# Patient Record
Sex: FEMALE | Race: WHITE
[De-identification: names, ages, dates, MRNs, and addresses within clinical notes are randomized per-mention and may not be internally consistent; named-entity substitution may affect disease eponyms.]

---

## 2020-08-02 ENCOUNTER — HOSPITAL ENCOUNTER (INPATIENT)
Dept: HOSPITAL 96 - M.ERS | Age: 40
LOS: 2 days | Discharge: HOME | DRG: 917 | End: 2020-08-04
Attending: INTERNAL MEDICINE | Admitting: INTERNAL MEDICINE
Payer: COMMERCIAL

## 2020-08-02 VITALS
DIASTOLIC BLOOD PRESSURE: 72 MMHG | BODY MASS INDEX: 34.32 KG/M2 | WEIGHT: 206 LBS | HEIGHT: 65 IN | SYSTOLIC BLOOD PRESSURE: 123 MMHG

## 2020-08-02 DIAGNOSIS — D72.829: ICD-10-CM

## 2020-08-02 DIAGNOSIS — F10.229: ICD-10-CM

## 2020-08-02 DIAGNOSIS — Z03.818: ICD-10-CM

## 2020-08-02 DIAGNOSIS — F17.210: ICD-10-CM

## 2020-08-02 DIAGNOSIS — T50.992A: Primary | ICD-10-CM
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
## 2020-08-02 DIAGNOSIS — R45.851: ICD-10-CM

## 2020-08-02 DIAGNOSIS — F32.9: ICD-10-CM

## 2020-08-02 DIAGNOSIS — G92: ICD-10-CM

## 2020-08-02 DIAGNOSIS — Z79.899: ICD-10-CM

## 2020-08-02 DIAGNOSIS — F41.9: ICD-10-CM

## 2020-08-02 DIAGNOSIS — E87.6: ICD-10-CM
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
## 2020-08-02 LAB
ALBUMIN SERPL-MCNC: 4 G/DL (ref 3.4–5)
ALP SERPL-CCNC: 81 U/L (ref 46–116)
ALT SERPL-CCNC: 22 U/L (ref 30–65)
ANION GAP SERPL CALC-SCNC: 12 MMOL/L (ref 7–16)
APAP SERPL-MCNC: < 2 UG/ML (ref 10–30)
AST SERPL-CCNC: 13 U/L (ref 15–37)
BILIRUB SERPL-MCNC: 0.5 MG/DL
BILIRUB UR-MCNC: NEGATIVE MG/DL
BUN SERPL-MCNC: 12 MG/DL (ref 7–18)
CALCIUM SERPL-MCNC: 8.5 MG/DL (ref 8.5–10.1)
CHLORIDE SERPL-SCNC: 103 MMOL/L (ref 98–107)
CO2 SERPL-SCNC: 24 MMOL/L (ref 21–32)
COLOR UR: YELLOW
CREAT SERPL-MCNC: 1 MG/DL (ref 0.6–1.3)
ETHANOL SERPL-MCNC: 106 MG/DL (ref ?–10)
GLUCOSE SERPL-MCNC: 172 MG/DL (ref 70–99)
HCT VFR BLD CALC: 46 % (ref 37–47)
HGB BLD-MCNC: 15.9 GM/DL (ref 12–15)
KETONES UR STRIP-MCNC: NEGATIVE MG/DL
MAGNESIUM SERPL-MCNC: 2 MG/DL (ref 1.8–2.4)
MCH RBC QN AUTO: 32 PG (ref 26–34)
MCHC RBC AUTO-ENTMCNC: 34.5 G/DL (ref 28–37)
MCV RBC: 92.8 FL (ref 80–100)
MPV: 9 FL. (ref 7.2–11.1)
NUCLEATED RBCS: 0 /100WBC
PLATELET COUNT*: 307 THOU/UL (ref 150–400)
POTASSIUM SERPL-SCNC: 3.2 MMOL/L (ref 3.5–5.1)
PROT SERPL-MCNC: 7.6 G/DL (ref 6.4–8.2)
PROT UR QL STRIP: NEGATIVE
RBC # BLD AUTO: 4.96 MIL/UL (ref 4.2–5)
RBC # UR STRIP: NEGATIVE /UL
RDW-CV: 13 % (ref 10.5–14.5)
SALICYLATES SERPL-MCNC: 3.2 MG/DL (ref 2.8–20)
SODIUM SERPL-SCNC: 139 MMOL/L (ref 136–145)
SP GR UR STRIP: 1.02 (ref 1–1.03)
URINE CLARITY: CLEAR
URINE GLUCOSE-RANDOM: NEGATIVE
URINE LEUKOCYTES-REFLEX: NEGATIVE
URINE NITRITE-REFLEX: NEGATIVE
UROBILINOGEN UR STRIP-ACNC: 0.2 E.U./DL (ref 0.2–1)
WBC # BLD AUTO: 15.2 THOU/UL (ref 4–11)

## 2020-08-02 NOTE — NUR
SPOKE TO MINDY REHMANION RICARDO REGARDING OVERDOSE OF TRAZADONE, REQUIP, AND
ACYLCOVIR, MINDY RECOMMENDS HYDRATION, RULE OUT OTHER TOXINS, AND WATCH FOR
HEADACHE, NAUSEA, AND VOMITTING.

## 2020-08-03 VITALS — DIASTOLIC BLOOD PRESSURE: 65 MMHG | SYSTOLIC BLOOD PRESSURE: 106 MMHG

## 2020-08-03 VITALS — DIASTOLIC BLOOD PRESSURE: 67 MMHG | SYSTOLIC BLOOD PRESSURE: 109 MMHG

## 2020-08-03 VITALS — DIASTOLIC BLOOD PRESSURE: 53 MMHG | SYSTOLIC BLOOD PRESSURE: 106 MMHG

## 2020-08-03 VITALS — DIASTOLIC BLOOD PRESSURE: 75 MMHG | SYSTOLIC BLOOD PRESSURE: 120 MMHG

## 2020-08-03 VITALS — SYSTOLIC BLOOD PRESSURE: 136 MMHG | DIASTOLIC BLOOD PRESSURE: 88 MMHG

## 2020-08-03 VITALS — DIASTOLIC BLOOD PRESSURE: 69 MMHG | SYSTOLIC BLOOD PRESSURE: 111 MMHG

## 2020-08-03 VITALS — DIASTOLIC BLOOD PRESSURE: 65 MMHG | SYSTOLIC BLOOD PRESSURE: 109 MMHG

## 2020-08-03 VITALS — SYSTOLIC BLOOD PRESSURE: 102 MMHG | DIASTOLIC BLOOD PRESSURE: 63 MMHG

## 2020-08-03 VITALS — DIASTOLIC BLOOD PRESSURE: 68 MMHG | SYSTOLIC BLOOD PRESSURE: 113 MMHG

## 2020-08-03 VITALS — SYSTOLIC BLOOD PRESSURE: 116 MMHG | DIASTOLIC BLOOD PRESSURE: 71 MMHG

## 2020-08-03 VITALS — DIASTOLIC BLOOD PRESSURE: 55 MMHG | SYSTOLIC BLOOD PRESSURE: 91 MMHG

## 2020-08-03 LAB
ABSOLUTE MONOCYTES: 0.6 THOU/UL (ref 0–1.2)
BE: -2.5 MMOL/L
GRANULOCYTES NFR BLD MANUAL: 76 %
LYMPHOCYTES # BLD: 1.7 THOU/UL (ref 0.8–5.3)
LYMPHOCYTES NFR BLD AUTO: 11 %
MONOCYTES NFR BLD: 4 %
NEUTROPHILS # BLD: 12.9 THOU/UL (ref 1.6–8.1)
NEUTS BAND NFR BLD: 9 %
PCO2 BLD: 32.5 MMHG (ref 35–45)
PLATELET # BLD EST: ADEQUATE 10*3/UL
PO2 BLD: 76.1 MMHG (ref 75–100)
RBC MORPH BLD: NORMAL

## 2020-08-03 NOTE — NUR
PT RESTING IN  BED THROUGHOUT SHIFT. PT UP TO BR WITH STEADY GAIT. SITTER AT
BS FOR SAFETY. SI PRECAUTIONS MAINTAINED. PT STATES SHE "DOESNT FEEL GOOD".
TOLERATING PO WELL.CONTINUES TO ENDORSE SI. IVF INFUSING. SHILOH NGUYỄN THIS
AFTERNOON,PT VOIDING WELL. PT CALM AND COOPERATIVE.

## 2020-08-03 NOTE — NUR
RECIEVED PT FROM ER AT 0320H, PUT IN 1CU6 AND HOOKED TO CARDIAC MONITOR.
VITALS STABLE AND SINUS RYTHM. ROOM CLEARED, SI PT. PT WAS ORIENTED, MILD
DROWSY AND WITH FLAT AFFECT AT TIMES. PT REFUSED TO CONTACT ANY FAMILY. [facility]
[employer] CALLED AND ASKED TO SEND ANOTHER SALYCILATE LEVEL AND CORRECT POTASIUM
LEVEL. NO MORE TREMORS OR JERKY MOVEMENT NOTED. KEPT SAFE. CONTINUE MONITORING
AND TOWARD GOALS.

## 2020-08-04 VITALS — DIASTOLIC BLOOD PRESSURE: 72 MMHG | SYSTOLIC BLOOD PRESSURE: 101 MMHG

## 2020-08-04 VITALS — DIASTOLIC BLOOD PRESSURE: 59 MMHG | SYSTOLIC BLOOD PRESSURE: 102 MMHG

## 2020-08-04 VITALS — SYSTOLIC BLOOD PRESSURE: 102 MMHG | DIASTOLIC BLOOD PRESSURE: 46 MMHG

## 2020-08-04 VITALS — DIASTOLIC BLOOD PRESSURE: 59 MMHG | SYSTOLIC BLOOD PRESSURE: 99 MMHG

## 2020-08-04 LAB
ABSOLUTE BASOPHILS: 0.1 THOU/UL (ref 0–0.2)
ABSOLUTE EOSINOPHILS: 0.1 THOU/UL (ref 0–0.7)
ABSOLUTE MONOCYTES: 0.9 THOU/UL (ref 0–1.2)
ANION GAP SERPL CALC-SCNC: 6 MMOL/L (ref 7–16)
BASOPHILS NFR BLD AUTO: 1.2 %
BUN SERPL-MCNC: 14 MG/DL (ref 7–18)
CALCIUM SERPL-MCNC: 7.7 MG/DL (ref 8.5–10.1)
CHLORIDE SERPL-SCNC: 110 MMOL/L (ref 98–107)
CO2 SERPL-SCNC: 27 MMOL/L (ref 21–32)
CREAT SERPL-MCNC: 1.1 MG/DL (ref 0.6–1.3)
EOSINOPHIL NFR BLD: 0.8 %
GLUCOSE SERPL-MCNC: 99 MG/DL (ref 70–99)
GRANULOCYTES NFR BLD MANUAL: 69.1 %
HCT VFR BLD CALC: 39.2 % (ref 37–47)
HGB BLD-MCNC: 13.7 GM/DL (ref 12–15)
LYMPHOCYTES # BLD: 2 THOU/UL (ref 0.8–5.3)
LYMPHOCYTES NFR BLD AUTO: 20 %
MCH RBC QN AUTO: 32.5 PG (ref 26–34)
MCHC RBC AUTO-ENTMCNC: 35 G/DL (ref 28–37)
MCV RBC: 93 FL (ref 80–100)
MONOCYTES NFR BLD: 8.9 %
MPV: 8.9 FL. (ref 7.2–11.1)
NEUTROPHILS # BLD: 6.9 THOU/UL (ref 1.6–8.1)
NUCLEATED RBCS: 0 /100WBC
PLATELET COUNT*: 228 THOU/UL (ref 150–400)
POTASSIUM SERPL-SCNC: 4 MMOL/L (ref 3.5–5.1)
RBC # BLD AUTO: 4.21 MIL/UL (ref 4.2–5)
RDW-CV: 12.8 % (ref 10.5–14.5)
SODIUM SERPL-SCNC: 143 MMOL/L (ref 136–145)
WBC # BLD AUTO: 9.9 THOU/UL (ref 4–11)

## 2020-08-04 NOTE — NUR
NO ACUTE CHANGES THROUGHOUT SHIFT. VSS. PT REFUSED MORNING MEDS AND REFUSED
BLOOD DRAW THIS AM. ALL ROUNDINGS COMPLETED, ALL NEEDS MET, FULL ASSESSMENT
COMPLETED AS CHARTED.

## 2020-08-04 NOTE — NUR
SW met with pt to complete initial assessment and discuss dc planning.  SW
mentioned doctor's recommendation for inpt psych due to pt SI.  Pt was
sleeping, SW awoke to discuss assessment and care plan.  Pt lives in [facility] and was driving towards her ex boyfriends house (according to pt nurse
reported yesterday) and decided to pull over and ask for help.  Pt did not
provide any support/family contact information but said that she does have
family and wants her phone even though she does not want her family to know
that she is in the hospital.  Pt has hx of inpt psych at [facility].  SW explained
SW to send referral to find inpt psych treatment as recommended and asked pt
if pt would prefer [facility] or [facility]. Pt said she wants to stay in [facility] because she really wants to figure out how to get her car.  SW said that
SW would update pt of inpt psych options.  Pt asked to see SW a few minutes
later and pt said she really wants to get her care and leave, pt said she does
not need inpt psych, she said she didn't say anything that would give anyone
the impression that she needed psych assistance.  Pt says she is okay and just
needs to get her car; pt expressed she is just anxious about receiving her
car that also holds her possessions.  SW to speak with [place] nguyen to
determine if car could be held and how long and possible ways to be able to
assure pt of safety of car and that she will eventually be able to receive her
car again even if after an inpt psych treatment.  SW faxed referral to [facility] and will fax to other psych facilities as needed to assist with
safe dc planning. [facility] inpt psych ph [phone], fax [phone]